# Patient Record
Sex: FEMALE | Race: WHITE | NOT HISPANIC OR LATINO | Employment: OTHER | URBAN - NONMETROPOLITAN AREA
[De-identification: names, ages, dates, MRNs, and addresses within clinical notes are randomized per-mention and may not be internally consistent; named-entity substitution may affect disease eponyms.]

---

## 2020-01-09 ENCOUNTER — HOSPITAL ENCOUNTER (OUTPATIENT)
Dept: BONE DENSITY | Facility: HOSPITAL | Age: 70
Discharge: HOME | End: 2020-01-09
Attending: NURSE PRACTITIONER | Admitting: NURSE PRACTITIONER
Payer: MEDICARE

## 2020-01-09 DIAGNOSIS — Z78.0 POSTMENOPAUSAL: ICD-10-CM

## 2020-01-09 PROCEDURE — 77080 DXA BONE DENSITY AXIAL: CPT | Mod: TC

## 2020-03-02 ENCOUNTER — HEALTH MAINTENANCE LETTER (OUTPATIENT)
Age: 70
End: 2020-03-02

## 2020-12-14 ENCOUNTER — HEALTH MAINTENANCE LETTER (OUTPATIENT)
Age: 70
End: 2020-12-14

## 2021-01-08 ENCOUNTER — ALLIED HEALTH/NURSE VISIT (OUTPATIENT)
Dept: FAMILY MEDICINE | Facility: OTHER | Age: 71
End: 2021-01-08
Attending: FAMILY MEDICINE
Payer: MEDICARE

## 2021-01-08 DIAGNOSIS — Z01.818 PREOP EXAMINATION: Primary | ICD-10-CM

## 2021-01-08 PROCEDURE — C9803 HOPD COVID-19 SPEC COLLECT: HCPCS

## 2021-01-08 NOTE — NURSING NOTE
Chief Complaint   Patient presents with     Covid 19 Testing     Patient swabbed for COVID-19 testing for colonoscopy    Lauren Esquivel MA on 1/8/2021 at 9:01 AM

## 2021-04-18 ENCOUNTER — HEALTH MAINTENANCE LETTER (OUTPATIENT)
Age: 71
End: 2021-04-18

## 2021-10-03 ENCOUNTER — HEALTH MAINTENANCE LETTER (OUTPATIENT)
Age: 71
End: 2021-10-03

## 2022-05-14 ENCOUNTER — HEALTH MAINTENANCE LETTER (OUTPATIENT)
Age: 72
End: 2022-05-14

## 2022-09-04 ENCOUNTER — HEALTH MAINTENANCE LETTER (OUTPATIENT)
Age: 72
End: 2022-09-04

## 2023-01-15 ENCOUNTER — HEALTH MAINTENANCE LETTER (OUTPATIENT)
Age: 73
End: 2023-01-15

## 2023-02-09 ENCOUNTER — MEDICAL CORRESPONDENCE (OUTPATIENT)
Dept: HEALTH INFORMATION MANAGEMENT | Facility: HOSPITAL | Age: 73
End: 2023-02-09

## 2023-02-13 ENCOUNTER — HOSPITAL ENCOUNTER (OUTPATIENT)
Dept: BONE DENSITY | Facility: HOSPITAL | Age: 73
Discharge: HOME | End: 2023-02-13
Attending: INTERNAL MEDICINE | Admitting: INTERNAL MEDICINE
Payer: COMMERCIAL

## 2023-02-13 DIAGNOSIS — K50.00 CROHN'S DISEASE OF SMALL INTESTINE WITHOUT COMPLICATION (H): ICD-10-CM

## 2023-02-13 PROCEDURE — 77080 DXA BONE DENSITY AXIAL: CPT

## 2023-04-03 ENCOUNTER — OFFICE VISIT (OUTPATIENT)
Dept: FAMILY MEDICINE | Facility: OTHER | Age: 73
End: 2023-04-03
Attending: FAMILY MEDICINE
Payer: COMMERCIAL

## 2023-04-03 VITALS
TEMPERATURE: 98.5 F | SYSTOLIC BLOOD PRESSURE: 112 MMHG | OXYGEN SATURATION: 97 % | HEART RATE: 102 BPM | BODY MASS INDEX: 24.41 KG/M2 | DIASTOLIC BLOOD PRESSURE: 76 MMHG | WEIGHT: 143 LBS | HEIGHT: 64 IN

## 2023-04-03 DIAGNOSIS — M85.89 OSTEOPENIA OF MULTIPLE SITES: ICD-10-CM

## 2023-04-03 DIAGNOSIS — R74.01 ELEVATED AST (SGOT): Primary | ICD-10-CM

## 2023-04-03 DIAGNOSIS — Z13.29 SCREENING FOR THYROID DISORDER: ICD-10-CM

## 2023-04-03 DIAGNOSIS — Z13.0 SCREENING FOR DEFICIENCY ANEMIA: ICD-10-CM

## 2023-04-03 DIAGNOSIS — Z13.1 SCREENING FOR DIABETES MELLITUS: ICD-10-CM

## 2023-04-03 DIAGNOSIS — Z00.00 ENCOUNTER FOR MEDICARE ANNUAL WELLNESS EXAM: Primary | ICD-10-CM

## 2023-04-03 DIAGNOSIS — Z13.6 SCREENING FOR AAA (AORTIC ABDOMINAL ANEURYSM): ICD-10-CM

## 2023-04-03 DIAGNOSIS — N81.4 UTERINE PROLAPSE: ICD-10-CM

## 2023-04-03 DIAGNOSIS — Z92.25 PERSONAL HISTORY OF IMMUNOSUPPRESSIVE THERAPY: ICD-10-CM

## 2023-04-03 DIAGNOSIS — Z11.59 NEED FOR HEPATITIS C SCREENING TEST: ICD-10-CM

## 2023-04-03 DIAGNOSIS — K50.00 CROHN'S DISEASE OF SMALL INTESTINE WITHOUT COMPLICATION (H): ICD-10-CM

## 2023-04-03 DIAGNOSIS — Z13.220 SCREENING FOR LIPID DISORDERS: ICD-10-CM

## 2023-04-03 LAB
ALBUMIN SERPL BCG-MCNC: 4 G/DL (ref 3.5–5.2)
ALP SERPL-CCNC: 76 U/L (ref 35–104)
ALT SERPL W P-5'-P-CCNC: 33 U/L (ref 10–35)
ANION GAP SERPL CALCULATED.3IONS-SCNC: 9 MMOL/L (ref 7–15)
AST SERPL W P-5'-P-CCNC: 42 U/L (ref 10–35)
BASOPHILS # BLD AUTO: 0 10E3/UL (ref 0–0.2)
BASOPHILS NFR BLD AUTO: 0 %
BILIRUB SERPL-MCNC: 0.3 MG/DL
BUN SERPL-MCNC: 11.5 MG/DL (ref 8–23)
CALCIUM SERPL-MCNC: 9 MG/DL (ref 8.8–10.2)
CHLORIDE SERPL-SCNC: 103 MMOL/L (ref 98–107)
CHOLEST SERPL-MCNC: 180 MG/DL
CREAT SERPL-MCNC: 0.79 MG/DL (ref 0.51–0.95)
DEPRECATED HCO3 PLAS-SCNC: 29 MMOL/L (ref 22–29)
EOSINOPHIL # BLD AUTO: 0 10E3/UL (ref 0–0.7)
EOSINOPHIL NFR BLD AUTO: 0 %
ERYTHROCYTE [DISTWIDTH] IN BLOOD BY AUTOMATED COUNT: 13.9 % (ref 10–15)
GFR SERPL CREATININE-BSD FRML MDRD: 80 ML/MIN/1.73M2
GLUCOSE SERPL-MCNC: 113 MG/DL (ref 70–99)
HCT VFR BLD AUTO: 41.1 % (ref 35–47)
HDLC SERPL-MCNC: 48 MG/DL
HGB BLD-MCNC: 13.3 G/DL (ref 11.7–15.7)
IMM GRANULOCYTES # BLD: 0 10E3/UL
IMM GRANULOCYTES NFR BLD: 0 %
LDLC SERPL CALC-MCNC: 59 MG/DL
LYMPHOCYTES # BLD AUTO: 0.8 10E3/UL (ref 0.8–5.3)
LYMPHOCYTES NFR BLD AUTO: 23 %
MCH RBC QN AUTO: 30.4 PG (ref 26.5–33)
MCHC RBC AUTO-ENTMCNC: 32.4 G/DL (ref 31.5–36.5)
MCV RBC AUTO: 94 FL (ref 78–100)
MONOCYTES # BLD AUTO: 0.5 10E3/UL (ref 0–1.3)
MONOCYTES NFR BLD AUTO: 12 %
NEUTROPHILS # BLD AUTO: 2.3 10E3/UL (ref 1.6–8.3)
NEUTROPHILS NFR BLD AUTO: 65 %
NONHDLC SERPL-MCNC: 132 MG/DL
NRBC # BLD AUTO: 0 10E3/UL
NRBC BLD AUTO-RTO: 0 /100
PLATELET # BLD AUTO: 250 10E3/UL (ref 150–450)
POTASSIUM SERPL-SCNC: 4.2 MMOL/L (ref 3.4–5.3)
PROT SERPL-MCNC: 7.4 G/DL (ref 6.4–8.3)
RBC # BLD AUTO: 4.38 10E6/UL (ref 3.8–5.2)
SODIUM SERPL-SCNC: 141 MMOL/L (ref 136–145)
TRIGL SERPL-MCNC: 366 MG/DL
TSH SERPL DL<=0.005 MIU/L-ACNC: 0.62 UIU/ML (ref 0.3–4.2)
WBC # BLD AUTO: 3.6 10E3/UL (ref 4–11)

## 2023-04-03 PROCEDURE — G0439 PPPS, SUBSEQ VISIT: HCPCS | Performed by: FAMILY MEDICINE

## 2023-04-03 PROCEDURE — 84443 ASSAY THYROID STIM HORMONE: CPT | Mod: ZL | Performed by: FAMILY MEDICINE

## 2023-04-03 PROCEDURE — 80053 COMPREHEN METABOLIC PANEL: CPT | Mod: ZL | Performed by: FAMILY MEDICINE

## 2023-04-03 PROCEDURE — 86803 HEPATITIS C AB TEST: CPT | Mod: ZL | Performed by: FAMILY MEDICINE

## 2023-04-03 PROCEDURE — 85025 COMPLETE CBC W/AUTO DIFF WBC: CPT | Mod: ZL | Performed by: FAMILY MEDICINE

## 2023-04-03 PROCEDURE — G0463 HOSPITAL OUTPT CLINIC VISIT: HCPCS

## 2023-04-03 PROCEDURE — 80061 LIPID PANEL: CPT | Mod: ZL | Performed by: FAMILY MEDICINE

## 2023-04-03 PROCEDURE — 36415 COLL VENOUS BLD VENIPUNCTURE: CPT | Mod: ZL | Performed by: FAMILY MEDICINE

## 2023-04-03 RX ORDER — AZATHIOPRINE 50 MG/1
150 TABLET ORAL DAILY
COMMUNITY
Start: 2023-02-04

## 2023-04-03 RX ORDER — LANOLIN ALCOHOL/MO/W.PET/CERES
1 CREAM (GRAM) TOPICAL
COMMUNITY
Start: 2023-02-03

## 2023-04-03 ASSESSMENT — ACTIVITIES OF DAILY LIVING (ADL): CURRENT_FUNCTION: NO ASSISTANCE NEEDED

## 2023-04-03 ASSESSMENT — ENCOUNTER SYMPTOMS
HEADACHES: 0
EYE PAIN: 0
ARTHRALGIAS: 0
COUGH: 0
WEAKNESS: 0
ABDOMINAL PAIN: 0
DYSURIA: 0
HEMATURIA: 0
NAUSEA: 0
BREAST MASS: 0
NERVOUS/ANXIOUS: 0
MYALGIAS: 0
JOINT SWELLING: 0
PARESTHESIAS: 0
CONSTIPATION: 0
FREQUENCY: 0
PALPITATIONS: 0
HEMATOCHEZIA: 0
FEVER: 0
SORE THROAT: 0
DIZZINESS: 0
HEARTBURN: 0
SHORTNESS OF BREATH: 0
DIARRHEA: 0
CHILLS: 0

## 2023-04-03 ASSESSMENT — PAIN SCALES - GENERAL: PAINLEVEL: NO PAIN (0)

## 2023-04-03 NOTE — NURSING NOTE
"Patient presents to clinic today for annual Medicare visit. Patient would also like to establish care with provider.     Medication Review: complete    /76   Pulse 102   Temp 98.5  F (36.9  C) (Tympanic)   Ht 1.613 m (5' 3.5\")   Wt 64.9 kg (143 lb)   LMP  (LMP Unknown)   SpO2 97%   Breastfeeding No   BMI 24.93 kg/m       FOOD SECURITY SCREENING QUESTIONS:  The next two questions are to help us understand your food security.  If you are feeling you need any assistance in this area, we have resources available to support you today.    Hunger Vital Signs:  Within the past 12 months we worried whether our food would run out before we got money to buy more. Never  Within the past 12 months the food we bought just didn't last and we didn't have money to get more. Never    Jenna Sotelo CNA on 4/3/2023 at 9:16 AM      "

## 2023-04-03 NOTE — PROGRESS NOTES
SUBJECTIVE:   Felicia is a 72 year old who presents for Preventive Visit.       View : No data to display.            Patient has been advised of split billing requirements and indicates understanding: Yes  Are you in the first 12 months of your Medicare coverage?  No    HPI    Have you ever done Advance Care Planning? (For example, a Health Directive, POLST, or a discussion with a medical provider or your loved ones about your wishes): No, advance care planning information given to patient to review.  Patient plans to discuss their wishes with loved ones or provider.      Would like a referral to Gynecology.  She has had issues with uterine prolapse which is getting worse with time and very annoying.     She has a history of Crohn's disease.  She has had 2 bowel resections.  Last colonoscopy was on 1/12/21 and is to follow up for her next in 3 years.  She is now on imuran.  She has been referred to Dermatology due to use of immunosuppressant.  She has an appointment coming soon.    Fall risk  Fallen 2 or more times in the past year?: No  Any fall with injury in the past year?: No    Cognitive Screening   1) Repeat 3 items (Leader, Season, Table)    2) Clock draw: NORMAL  3) 3 item recall: Recalls 3 objects  Results: NORMAL clock, 1-2 items recalled: COGNITIVE IMPAIRMENT LESS LIKELY    Mini-CogTM Copyright AIDE [employer]. Licensed by the author for use in [facility]; reprinted with permission (george@.[facility]). All rights reserved.      Do you have sleep apnea, excessive snoring or daytime drowsiness?: no    Reviewed and updated as needed this visit by clinical staff   Tobacco  Allergies  Meds  Problems     Soc Hx        Reviewed and updated as needed this visit by Provider   Tobacco  Allergies  Meds  Problems            Social History     Tobacco Use     Smoking status: Never     Passive exposure: Never     Smokeless tobacco: Never   Vaping Use     Vaping status: Never Used   Substance Use Topics      Alcohol use: Not Currently             4/3/2023     9:11 AM   Alcohol Use   Prescreen: >3 drinks/day or >7 drinks/week? Not Applicable     Do you have a current opioid prescription? No  Do you use any other controlled substances or medications that are not prescribed by a provider? None              Current providers sharing in care for this patient include:   Patient Care Team:  Tyler Salgado MD as PCP - General (Internal Medicine)    The following health maintenance items are reviewed in Epic and correct as of today:  Health Maintenance   Topic Date Due     ADVANCE CARE PLANNING  Never done     COVID-19 Vaccine (1) Never done     HEPATITIS C SCREENING  Never done     LIPID  Never done     ZOSTER IMMUNIZATION (1 of 2) Never done     Pneumococcal Vaccine: 65+ Years (2 - PCV) 05/19/2009     INFLUENZA VACCINE (1) Never done     COLORECTAL CANCER SCREENING  01/12/2024     MEDICARE ANNUAL WELLNESS VISIT  04/03/2024     FALL RISK ASSESSMENT  04/03/2024     MAMMO SCREENING  11/22/2024     DTAP/TDAP/TD IMMUNIZATION (3 - Td or Tdap) 12/19/2029     DEXA  02/13/2038     PHQ-2 (once per calendar year)  Completed     IPV IMMUNIZATION  Aged Out     MENINGITIS IMMUNIZATION  Aged Out     BP Readings from Last 3 Encounters:   04/03/23 112/76    Wt Readings from Last 3 Encounters:   04/03/23 64.9 kg (143 lb)                  Patient Active Problem List   Diagnosis     Anemia, vitamin B12 deficiency     Osteopenia     Regional enteritis (H)     Urethral caruncle     Crohn's disease of small intestine without complication (H)     Past Surgical History:   Procedure Laterality Date     APPENDECTOMY  1980    removed at time of abscess     BOWEL RESECTION  1997    small intestine, partial large intestine     COLON SURGERY  1980    abscess related to crohn's     COLONOSCOPY  01/21/2021    multiple, last completed 1/12/21 - f/u 3 years due to crohn's     EGD  04/30/2008    multiple     EXAM UNDER ANESTHESIA ABDOMEN  08/03/2016     Endoscopy? Dr. Jayne Flood     partial ileum resection  2008     CO DRAIN ABD ABSCESS PERCUTANEOUS  05/17/2008     CO REMOVAL OF OVARY/TUBE(S) Right 03/01/2005     TUBAL LIGATION  1975       Social History     Tobacco Use     Smoking status: Never     Passive exposure: Never     Smokeless tobacco: Never   Vaping Use     Vaping status: Never Used   Substance Use Topics     Alcohol use: Not Currently     Family History   Problem Relation Age of Onset     Atrial fibrillation Mother      Arthritis Mother      Cardiovascular Father      Cerebrovascular Disease Father      Coronary Artery Disease Brother         CABG for coronary aneurysms     Cerebrovascular Disease Brother         CVA age 57     Breast Cancer Daughter      Breast Cancer Maternal Aunt 60         Current Outpatient Medications   Medication Sig Dispense Refill     acetaminophen 500 MG CAPS Take 1 capsule by mouth       azaTHIOprine (IMURAN) 50 MG tablet Take 150 mg by mouth daily       CALCIUM PO Take 3 tablets by mouth 3 times daily       cholecalciferol 50 MCG (2000 UT) tablet        cyanocobalamin (VITAMIN B-12) 1000 MCG tablet Take 1 tablet by mouth daily at 2 pm       Magnesium Oxide 250 MG TABS Take 1 tablet by mouth daily       Multiple Vitamin (MULTIVITAMIN ADULT PO) Take 2 tablets by mouth 2 times daily       Allergies   Allergen Reactions     Wheat Bran      Flatulence             Review of Systems   Constitutional: Negative for chills and fever.   HENT: Negative for congestion, ear pain, hearing loss and sore throat.    Eyes: Negative for pain and visual disturbance.   Respiratory: Negative for cough and shortness of breath.    Cardiovascular: Negative for chest pain, palpitations and peripheral edema.   Gastrointestinal: Negative for abdominal pain, constipation, diarrhea, heartburn, hematochezia and nausea.   Breasts:  Negative for tenderness, breast mass and discharge.   Genitourinary: Negative for dysuria, frequency, genital sores,  "hematuria, pelvic pain, urgency, vaginal bleeding and vaginal discharge.   Musculoskeletal: Negative for arthralgias, joint swelling and myalgias.   Skin: Negative for rash.   Neurological: Negative for dizziness, weakness, headaches and paresthesias.   Psychiatric/Behavioral: Negative for mood changes. The patient is not nervous/anxious.      Constitutional, HEENT, cardiovascular, pulmonary, GI, , musculoskeletal, neuro, skin, endocrine and psych systems are negative, except as otherwise noted.    OBJECTIVE:   /76   Pulse 102   Temp 98.5  F (36.9  C) (Tympanic)   Ht 1.613 m (5' 3.5\")   Wt 64.9 kg (143 lb)   LMP  (LMP Unknown)   SpO2 97%   Breastfeeding No   BMI 24.93 kg/m   Estimated body mass index is 24.93 kg/m  as calculated from the following:    Height as of this encounter: 1.613 m (5' 3.5\").    Weight as of this encounter: 64.9 kg (143 lb).  Physical Exam  Constitutional:       General: She is not in acute distress.     Appearance: She is well-developed.   HENT:      Head: Normocephalic.      Right Ear: Tympanic membrane and external ear normal.      Left Ear: Tympanic membrane and external ear normal.      Nose: Nose normal.      Mouth/Throat:      Pharynx: No oropharyngeal exudate.   Eyes:      General:         Right eye: No discharge.         Left eye: No discharge.      Conjunctiva/sclera: Conjunctivae normal.      Pupils: Pupils are equal, round, and reactive to light.   Neck:      Thyroid: No thyromegaly.      Trachea: No tracheal deviation.   Cardiovascular:      Rate and Rhythm: Normal rate and regular rhythm.      Pulses: Normal pulses.      Heart sounds: Normal heart sounds, S1 normal and S2 normal. No murmur heard.     No friction rub. No gallop. No S3 or S4 sounds.   Pulmonary:      Effort: Pulmonary effort is normal. No respiratory distress.      Breath sounds: Normal breath sounds. No wheezing or rales.      Comments: Breast exam:  No masses palpable bilaterally.  No skin " changes, tethering or axillary lymphadenopathy bilaterally.    Abdominal:      General: Bowel sounds are normal. There is no distension.      Palpations: Abdomen is soft. There is no mass.      Tenderness: There is no abdominal tenderness.   Genitourinary:     Comments: Pelvic/Rectal exams deferred per patient.  Musculoskeletal:         General: Normal range of motion.      Cervical back: Neck supple.   Lymphadenopathy:      Cervical: No cervical adenopathy.   Skin:     General: Skin is warm and dry.      Findings: No rash.   Neurological:      Mental Status: She is alert and oriented to person, place, and time.      Motor: No abnormal muscle tone.      Deep Tendon Reflexes: Reflexes are normal and symmetric.   Psychiatric:         Thought Content: Thought content normal.         Judgment: Judgment normal.           Diagnostic Test Results:  Labs reviewed in Epic    ASSESSMENT / PLAN:       ICD-10-CM    1. Encounter for Medicare annual wellness exam  Z00.00       2. Screening for AAA (aortic abdominal aneurysm)  Z13.6 [facility] Medicare AAA Screening      3. Crohn's disease of small intestine without complication (H)  K50.00       4. Personal history of immunosuppressive therapy  Z92.25       5. Uterine prolapse  N81.4 Ob/Gyn Referral      6. Need for hepatitis C screening test  Z11.59 Hepatitis C Screen Reflex to HCV RNA Quant and Genotype     Hepatitis C Screen Reflex to HCV RNA Quant and Genotype      7. Screening for lipid disorders  Z13.220 Lipid Profile     Lipid Profile      8. Screening for diabetes mellitus  Z13.1 Comprehensive metabolic panel     Comprehensive metabolic panel      9. Screening for thyroid disorder  Z13.29 TSH with free T4 reflex     TSH with free T4 reflex      10. Screening for deficiency anemia  Z13.0 CBC with Platelets & Differential     CBC with Platelets & Differential        1.  Last mammogram was done 11/22/22 through [facility].  Last DEXA was done 2/13/23 and showed osteopenia.   Colonoscopy last completed 1/12/21 and 3 year follow up was recommended.  Abdominal aortic aneurysm screening ultrasound ordered.  Pap Smear deferred due to age >65.  Prior Pap Smears were normal per patient.  Tdap last completed either on 2/13/08 or 12/19/19 ([facility] not functioning today).  She cannot recall when her last was and doesn't want another today.  Pneumovax last completed on 5/19/08.  She declined prevenar, covid, flu and shingrix vaccines today.  2.  See #1.  3.  As above, follows with GI and is on imuran.  Will be due for next colonoscopy on 1/12/24, which will be done with GI.  4.  As above, is on imuran due to Crohn's disease.  She has been referred to Dermatology because of this to monitor for skin cancers.  5.  Referred to Gynecology.  6.  Discussed that due to frax scores of 8% and 21%, she could consider treatment with bisphosphonate.  Discussed options of oral fosamax, every 6 month injection with prolia or yearly reclast infusion.  Discussed the risks and benefits.  She will consider these and get back to us if she decides to pursue anything further.  Otherwise, would recommend calcium, vitamin D and weight bearing exercise.  7.  Screening Hepatitis C as above.  8.  Lipid profile as above.  She is not fasting today.  9.  Comprehensive Metabolic Profile as above.  10.  TSH as above.  11.  Complete Blood Count as above.  Patient has been advised of split billing requirements and indicates understanding: Yes      COUNSELING:  Reviewed preventive health counseling, as reflected in patient instructions       Regular exercise       Healthy diet/nutrition       Vision screening       Dental care       Bladder control       Osteoporosis prevention/bone health       Colon cancer screening       Hepatitis C screening        She reports that she has never smoked. She has never been exposed to tobacco smoke. She has never used smokeless tobacco.      Appropriate preventive services were discussed with this  patient, including applicable screening as appropriate for cardiovascular disease, diabetes, osteopenia/osteoporosis, and glaucoma.  As appropriate for age/gender, discussed screening for colorectal cancer, prostate cancer, breast cancer, and cervical cancer. Checklist reviewing preventive services available has been given to the patient.    Reviewed patients plan of care and provided an AVS. The Basic Care Plan (routine screening as documented in Health Maintenance) for Felicia meets the Care Plan requirement. This Care Plan has been established and reviewed with the Patient.      Kizzy Santana MD  [facility]    Identified Health Risks:    I have reviewed Opioid Use Disorder and Substance Use Disorder risk factors and made any needed referrals.

## 2023-04-03 NOTE — PATIENT INSTRUCTIONS
Patient Education   Personalized Prevention Plan  You are due for the preventive services outlined below.  Your care team is available to assist you in scheduling these services.  If you have already completed any of these items, please share that information with your care team to update in your medical record.  Health Maintenance Due   Topic Date Due     Discuss Advance Care Planning  Never done     COVID-19 Vaccine (1) Never done     Colorectal Cancer Screening  Never done     Hepatitis C Screening  Never done     Cholesterol Lab  Never done     Zoster (Shingles) Vaccine (1 of 2) Never done     Pneumococcal Vaccine (2 - PCV) 05/19/2009     Annual Wellness Visit  05/18/2016     Flu Vaccine (1) Never done

## 2023-04-04 LAB — HCV AB SERPL QL IA: NONREACTIVE

## 2023-05-02 ENCOUNTER — OFFICE VISIT (OUTPATIENT)
Dept: OBGYN | Facility: OTHER | Age: 73
End: 2023-05-02
Attending: FAMILY MEDICINE
Payer: COMMERCIAL

## 2023-05-02 VITALS
BODY MASS INDEX: 24.41 KG/M2 | WEIGHT: 143 LBS | DIASTOLIC BLOOD PRESSURE: 64 MMHG | HEIGHT: 64 IN | HEART RATE: 80 BPM | SYSTOLIC BLOOD PRESSURE: 122 MMHG

## 2023-05-02 DIAGNOSIS — N81.4 UTERINE PROLAPSE: Primary | ICD-10-CM

## 2023-05-02 PROCEDURE — 51798 US URINE CAPACITY MEASURE: CPT | Performed by: OBSTETRICS & GYNECOLOGY

## 2023-05-02 PROCEDURE — G0463 HOSPITAL OUTPT CLINIC VISIT: HCPCS | Mod: 25

## 2023-05-02 PROCEDURE — 99204 OFFICE O/P NEW MOD 45 MIN: CPT | Performed by: OBSTETRICS & GYNECOLOGY

## 2023-05-02 ASSESSMENT — PAIN SCALES - GENERAL: PAINLEVEL: NO PAIN (0)

## 2023-05-02 NOTE — PROGRESS NOTES
Gynecology Visit    CC: prolapse    HPI:    Felicia Tijerina is a 72 year old  here for the above concern. She reports a vaginal bulge for many years. Was seen by a gynecologist in 2017, never followed up. Reports the bulge is getting bigger. It is bothersome but not painful. She is also having more trouble emptying her bladder, feels like she is retaining urine, has a slow stream. No significant urinary incontinence.  No trouble with BMs. No dysuria, hematuria or blood in her stool. She is sexually active, no concerns. No concerns with frequent UTI.     Surgical history significant for multiple small bowel resections, prior large bowel resection but denies any near her rectum. She is s/p tubal ligation and right oophorectomy for an ovarian cyst.     OBHx  OB History    Para Term  AB Living   3 3 3 0 0 3   SAB IAB Ectopic Multiple Live Births   0 0 0 0 3      # Outcome Date GA Lbr Pavel/2nd Weight Sex Delivery Anes PTL Lv   3 Term 76    M Vag-Spont   HA   2 Term 74     Vag-Spont   HA   1 Term 10/29/70     Vag-Spont   HA       PMHx:   Past Medical History:   Diagnosis Date     Crohn's disease (H)     diagnosed in her 30s     History of bowel resection     segment of large and small intestine     History of resection of terminal ileum      History of skin cancer     squamous?  on chest       PSHx:   Past Surgical History:   Procedure Laterality Date     APPENDECTOMY  1980    removed at time of abscess     BOWEL RESECTION      small intestine, partial large intestine     COLON SURGERY      abscess related to crohn's     COLONOSCOPY  2021    multiple, last completed 21 - f/u 3 years due to crohn's     EGD  2008    multiple     EXAM UNDER ANESTHESIA ABDOMEN  2016    Endoscopy? Dr. Jayne Flood     partial ileum resection       MD DRAIN ABD ABSCESS PERCUTANEOUS  2008     MD REMOVAL OF OVARY/TUBE(S) Right 2005     TUBAL LIGATION  1975     "  Meds:   Current Outpatient Medications   Medication     acetaminophen 500 MG CAPS     azaTHIOprine (IMURAN) 50 MG tablet     CALCIUM PO     cholecalciferol 50 MCG (2000 UT) tablet     cyanocobalamin (VITAMIN B-12) 1000 MCG tablet     Magnesium Oxide 250 MG TABS     Multiple Vitamin (MULTIVITAMIN ADULT PO)     No current facility-administered medications for this visit.      Allergies:       Allergies   Allergen Reactions     Wheat Bran      Flatulence       SocHx:   Social History     Tobacco Use     Smoking status: Never     Passive exposure: Never     Smokeless tobacco: Never   Vaping Use     Vaping status: Never Used   Substance Use Topics     Alcohol use: Not Currently     Drug use: Not Currently     Lives with her . Enjoys sewing, stained glass, gardening    FamHx:   Family History   Problem Relation Age of Onset     Atrial fibrillation Mother      Arthritis Mother      Cardiovascular Father      Cerebrovascular Disease Father      Coronary Artery Disease Brother         CABG for coronary aneurysms     Cerebrovascular Disease Brother         CVA age 57     Breast Cancer Daughter      Breast Cancer Maternal Aunt 60      Physical Exam  /64 (BP Location: Right arm, Patient Position: Sitting, Cuff Size: Adult Regular)   Pulse 80   Ht 1.626 m (5' 4\")   Wt 64.9 kg (143 lb)   LMP  (LMP Unknown)   BMI 24.55 kg/m    Gen: Well-appearing, NAD  Resp: nonlabored  Psych: appropriate mood and affect    Pelvic:  Normal appearing external female genitalia. Normal hair distribution. Vagina is without lesions. No significant discharge. Cervix normal, no lesions, no cervical motion tenderness. Uterus is small, mobile, non-tender. No adnexal tenderness or masses. Complete procidentia. Posterior support up to about 3cm above hymen.     PVR 94 mL      Assessment/Plan  Felicia Tijerina is a 72 year old  female here for Grade IV cystocele, uterine prolapse, grade 2 rectocele. Reviewed options, including expectant " management, pessary, surgical repair. Discussed option of midurethral sling. Also discussed splinting if she is having difficulty voiding. Handouts provided, debating between pessary vs native tissue repair. Will have her f/u in clinic after she has reviewed the information to make a plan.     Angela Campbell MD  OB/GYN  5/2/2023 10:06 AM

## 2023-05-02 NOTE — NURSING NOTE
Chief Complaint   Patient presents with     Consult     Uterine Prolapse       Medication Reconciliation: complete    Meena Vargas LPN........................5/2/2023  10:10 AM

## 2023-05-16 ENCOUNTER — OFFICE VISIT (OUTPATIENT)
Dept: OBGYN | Facility: OTHER | Age: 73
End: 2023-05-16
Attending: OBSTETRICS & GYNECOLOGY
Payer: COMMERCIAL

## 2023-05-16 VITALS
HEART RATE: 72 BPM | SYSTOLIC BLOOD PRESSURE: 122 MMHG | WEIGHT: 143 LBS | BODY MASS INDEX: 24.55 KG/M2 | DIASTOLIC BLOOD PRESSURE: 64 MMHG

## 2023-05-16 DIAGNOSIS — N81.4 UTERINE PROLAPSE: Primary | ICD-10-CM

## 2023-05-16 PROCEDURE — A4562 PESSARY, NON RUBBER,ANY TYPE: HCPCS | Performed by: OBSTETRICS & GYNECOLOGY

## 2023-05-16 PROCEDURE — G0463 HOSPITAL OUTPT CLINIC VISIT: HCPCS | Mod: 25

## 2023-05-16 PROCEDURE — 99213 OFFICE O/P EST LOW 20 MIN: CPT | Mod: 25 | Performed by: OBSTETRICS & GYNECOLOGY

## 2023-05-16 PROCEDURE — 57160 INSERT PESSARY/OTHER DEVICE: CPT | Performed by: OBSTETRICS & GYNECOLOGY

## 2023-05-16 ASSESSMENT — PAIN SCALES - GENERAL: PAINLEVEL: NO PAIN (0)

## 2023-05-16 NOTE — PROGRESS NOTES
Follow-Up Visit    S: Ms. Felicia Tijerina is a 72 year old  here for prolapse follow up. After considering all the information she received last visit, she wants to wait on surgery and is interested in trying a pessary.     O:  /64 (BP Location: Right arm, Patient Position: Sitting, Cuff Size: Adult Regular)   Pulse 72   Wt 64.9 kg (143 lb)   LMP  (LMP Unknown)   BMI 24.55 kg/m    Gen: Well-appearing, NAD  Pulm: nonlabored  Psych: appropriate mood and affect    Pelvic:  Normal appearing external female genitalia. Normal hair distribution. Complete procidentia. Uterus is small, mobile, non-tender. No adnexal tenderness or masses    Patient fitted with a #4 ring pessary with support.    A/P:  Ms. Felicia Tijerina is a 72 year old  here for uterine prolapse, pessary fitting. She was taught how to remove and replace herself.   - RTC 3-4 weeks    Angela Campbell MD  OB/GYN  2023 10:41 AM

## 2023-05-16 NOTE — NURSING NOTE
Chief Complaint   Patient presents with     Follow Up     Discuss Prolapse        Medication Reconciliation: remy Vargas LPN........................5/16/2023  10:40 AM

## 2023-06-07 ENCOUNTER — OFFICE VISIT (OUTPATIENT)
Dept: OBGYN | Facility: OTHER | Age: 73
End: 2023-06-07
Attending: OBSTETRICS & GYNECOLOGY
Payer: COMMERCIAL

## 2023-06-07 VITALS
DIASTOLIC BLOOD PRESSURE: 68 MMHG | BODY MASS INDEX: 24.55 KG/M2 | HEART RATE: 78 BPM | OXYGEN SATURATION: 96 % | SYSTOLIC BLOOD PRESSURE: 124 MMHG | WEIGHT: 143 LBS

## 2023-06-07 DIAGNOSIS — Z46.89 PESSARY MAINTENANCE: Primary | ICD-10-CM

## 2023-06-07 PROCEDURE — G0463 HOSPITAL OUTPT CLINIC VISIT: HCPCS | Performed by: OBSTETRICS & GYNECOLOGY

## 2023-06-07 PROCEDURE — 99213 OFFICE O/P EST LOW 20 MIN: CPT | Performed by: OBSTETRICS & GYNECOLOGY

## 2023-06-07 ASSESSMENT — PAIN SCALES - GENERAL: PAINLEVEL: NO PAIN (0)

## 2023-06-07 NOTE — NURSING NOTE
Patient presents to clinic for follow up pessary care  /68   Pulse 78   Wt 64.9 kg (143 lb)   LMP  (LMP Unknown)   SpO2 96%   BMI 24.55 kg/m    Renee Fenton LPN on 6/7/2023 at 10:34 AM

## 2023-06-07 NOTE — PROGRESS NOTES
Follow-Up Visit    S: Ms. Felicia Tijerina is a 73 year old here for pessary check. She reports no concerns. Is taking her pessary out overnight twice a week.    O:  /68   Pulse 78   Wt 64.9 kg (143 lb)   LMP  (LMP Unknown)   SpO2 96%   BMI 24.55 kg/m    Gen: Well-appearing, NAD  Pulm: nonlabored  Psych: appropriate mood and affect    Pelvic:  Normal appearing external female genitalia. Normal hair distribution. Vagina is without lesions. Moderate white discharge. Cervix normal, no lesions    #4 ring pessary with support removed, cleaned and replaced    A/P:  Ms. Felicia Tijerina is a 73 year old here for pessary check. No concerns today. Will plan for 6 month follow up since she is taking it out twice a week.       Angela Campebll MD  OB/GYN  6/7/2023 11:15 AM

## 2023-08-07 ENCOUNTER — HOSPITAL ENCOUNTER (OUTPATIENT)
Dept: ULTRASOUND IMAGING | Facility: OTHER | Age: 73
Discharge: HOME | End: 2023-08-07
Attending: FAMILY MEDICINE | Admitting: FAMILY MEDICINE
Payer: COMMERCIAL

## 2023-08-07 DIAGNOSIS — Z13.6 SCREENING FOR AAA (AORTIC ABDOMINAL ANEURYSM): ICD-10-CM

## 2023-08-07 PROCEDURE — 76706 US ABDL AORTA SCREEN AAA: CPT

## 2023-11-30 ENCOUNTER — MYC MEDICAL ADVICE (OUTPATIENT)
Dept: FAMILY MEDICINE | Facility: OTHER | Age: 73
End: 2023-11-30
Payer: COMMERCIAL

## 2023-12-07 ENCOUNTER — OFFICE VISIT (OUTPATIENT)
Dept: OBGYN | Facility: OTHER | Age: 73
End: 2023-12-07
Payer: COMMERCIAL

## 2023-12-07 VITALS
DIASTOLIC BLOOD PRESSURE: 74 MMHG | BODY MASS INDEX: 24.72 KG/M2 | SYSTOLIC BLOOD PRESSURE: 128 MMHG | HEART RATE: 68 BPM | WEIGHT: 144 LBS

## 2023-12-07 DIAGNOSIS — Z46.89 ENCOUNTER FOR PESSARY MAINTENANCE: Primary | ICD-10-CM

## 2023-12-07 PROCEDURE — 99213 OFFICE O/P EST LOW 20 MIN: CPT

## 2023-12-07 PROCEDURE — G0463 HOSPITAL OUTPT CLINIC VISIT: HCPCS

## 2023-12-07 NOTE — PROGRESS NOTES
Follow-Up Visit    S: Ms. Felicia Tijerina is a 73 year old here for pessary check. She reports no concerns and she is taking it out 2x weekly.     O:  /74   Pulse 68   Wt 65.3 kg (144 lb)   LMP  (LMP Unknown)   BMI 24.72 kg/m    Gen: Well-appearing, NAD  Pulm: nonlabored  Psych: appropriate mood and affect    Pelvic: On exam, the pessary is first removed,cleansed with soap and water and set aside. Normal appearing external female genitalia. Normal hair distribution. Speculum exam shows vagina is without lesions. minimal discharge. Pessary was then lubricated and reinserted with no concerns.     A/P:  Ms. Felicia Tijerina is a 73 year old here for pessary check.   - RTC 6 months.     RHIANNA Vivar  12/7/2023 11:01 AM

## 2023-12-07 NOTE — NURSING NOTE
Chief Complaint   Patient presents with    Follow Up     Pessary Check        Medication Reconciliation: complete        Nohemi Potter LPN

## 2024-01-22 ENCOUNTER — TRANSFERRED RECORDS (OUTPATIENT)
Dept: HEALTH INFORMATION MANAGEMENT | Facility: OTHER | Age: 74
End: 2024-01-22
Payer: COMMERCIAL

## 2024-06-06 ENCOUNTER — OFFICE VISIT (OUTPATIENT)
Dept: OBGYN | Facility: OTHER | Age: 74
End: 2024-06-06
Payer: COMMERCIAL

## 2024-06-06 VITALS
SYSTOLIC BLOOD PRESSURE: 118 MMHG | WEIGHT: 144 LBS | HEART RATE: 51 BPM | BODY MASS INDEX: 24.72 KG/M2 | DIASTOLIC BLOOD PRESSURE: 62 MMHG

## 2024-06-06 DIAGNOSIS — D84.821 IMMUNODEFICIENCY DUE TO DRUG THERAPY (H): ICD-10-CM

## 2024-06-06 DIAGNOSIS — Z46.89 PESSARY MAINTENANCE: Primary | ICD-10-CM

## 2024-06-06 DIAGNOSIS — Z79.899 IMMUNODEFICIENCY DUE TO DRUG THERAPY (H): ICD-10-CM

## 2024-06-06 DIAGNOSIS — K50.00 CROHN'S DISEASE OF SMALL INTESTINE WITHOUT COMPLICATION (H): ICD-10-CM

## 2024-06-06 PROCEDURE — 99214 OFFICE O/P EST MOD 30 MIN: CPT

## 2024-06-06 PROCEDURE — G0463 HOSPITAL OUTPT CLINIC VISIT: HCPCS

## 2024-06-06 ASSESSMENT — PAIN SCALES - GENERAL: PAINLEVEL: NO PAIN (0)

## 2024-06-06 NOTE — PROGRESS NOTES
Follow-Up Visit    S: Ms. Felicia Tijerina is a 74 year old here for pessary check. She reports no concerns with pessary.  She reports she is taking her pessary out approximately twice a week.  She has a ring #4 pessary with support in place.  Denies any constipation.  Continues medication for Crohn's.  Does not voice any concerns with this today    O:  /62   Pulse 51   Wt 65.3 kg (144 lb)   LMP  (LMP Unknown)   BMI 24.72 kg/m    Gen: Well-appearing, NAD  Pulm: nonlabored  Psych: appropriate mood and affect    Pelvic: On exam, the pessary is first removed,cleansed with soap and water and set aside. Normal appearing external female genitalia. Normal hair distribution. Speculum exam shows vagina is without lesions. minimal discharge. Pessary was then lubricated and reinserted with no concerns.     A/P:  Ms. Felicia Tijerina is a 74 year old here for pessary check.  No concerns today.  As she is taking it out we will continue her 6-month follow-up plan.  Denies any bowel symptoms.  Continue follow-up with provider for Crohn's.  - RTC 6 months    RHIANNA Vivar  6/6/2024 10:20 AM

## 2024-06-06 NOTE — NURSING NOTE
"Chief Complaint   Patient presents with    Pessary Check/Fit/Insert     Patient is here for pessary check, patient is having some discharge and some bleeding. Patient is taking pessary out to clean self at home.   Initial /62   Pulse 51   Wt 65.3 kg (144 lb)   LMP  (LMP Unknown)   BMI 24.72 kg/m   Estimated body mass index is 24.72 kg/m  as calculated from the following:    Height as of 5/2/23: 1.626 m (5' 4\").    Weight as of this encounter: 65.3 kg (144 lb).  Medication Reconciliation: complete        Beatriz Hicks LPN    "

## 2024-07-06 ENCOUNTER — HEALTH MAINTENANCE LETTER (OUTPATIENT)
Age: 74
End: 2024-07-06

## 2025-02-09 ENCOUNTER — HEALTH MAINTENANCE LETTER (OUTPATIENT)
Age: 75
End: 2025-02-09

## 2025-07-13 ENCOUNTER — HEALTH MAINTENANCE LETTER (OUTPATIENT)
Age: 75
End: 2025-07-13